# Patient Record
Sex: FEMALE | Race: WHITE | Employment: FULL TIME | URBAN - NONMETROPOLITAN AREA
[De-identification: names, ages, dates, MRNs, and addresses within clinical notes are randomized per-mention and may not be internally consistent; named-entity substitution may affect disease eponyms.]

---

## 2020-05-19 LAB
ABO, EXTERNAL RESULT: NORMAL
C. TRACHOMATIS, EXTERNAL RESULT: NEGATIVE
HEP B, EXTERNAL RESULT: NEGATIVE
HEPATITIS C ANTIBODY, EXTERNAL RESULT: NEGATIVE
HIV, EXTERNAL RESULT: NEGATIVE
N. GONORRHOEAE, EXTERNAL RESULT: NEGATIVE
RH FACTOR, EXTERNAL RESULT: POSITIVE
RHOGAM, EXTERNAL RESULT: NORMAL
RPR, EXTERNAL RESULT: NON REACTIVE
RUBELLA TITER, EXTERNAL RESULT: NORMAL

## 2020-08-14 PROBLEM — O09.90 HIGH-RISK PREGNANCY: Status: ACTIVE | Noted: 2020-08-14

## 2020-08-14 RX ORDER — ESCITALOPRAM OXALATE 10 MG/1
10 TABLET ORAL DAILY
COMMUNITY
End: 2021-08-02

## 2020-08-31 ENCOUNTER — ROUTINE PRENATAL (OUTPATIENT)
Dept: OBGYN CLINIC | Age: 35
End: 2020-08-31

## 2020-08-31 ENCOUNTER — HOSPITAL ENCOUNTER (OUTPATIENT)
Age: 35
Setting detail: SPECIMEN
Discharge: HOME | End: 2020-08-31
Payer: COMMERCIAL

## 2020-08-31 VITALS
SYSTOLIC BLOOD PRESSURE: 113 MMHG | HEIGHT: 64 IN | DIASTOLIC BLOOD PRESSURE: 62 MMHG | BODY MASS INDEX: 23.29 KG/M2 | WEIGHT: 136.4 LBS

## 2020-08-31 LAB
ABSOLUTE EOS #: 0.07 K/UL (ref 0–0.44)
ABSOLUTE IMMATURE GRANULOCYTE: 0.09 K/UL (ref 0–0.3)
ABSOLUTE LYMPH #: 2.22 K/UL (ref 1.1–3.7)
ABSOLUTE MONO #: 0.74 K/UL (ref 0.1–1.2)
BASOPHILS # BLD: 0 % (ref 0–2)
BASOPHILS ABSOLUTE: 0.04 K/UL (ref 0–0.2)
DIFFERENTIAL TYPE: ABNORMAL
EOSINOPHILS RELATIVE PERCENT: 1 % (ref 1–4)
GLUCOSE ADMINISTRATION: ABNORMAL
GLUCOSE TOLERANCE SCREEN 50G: 141 MG/DL (ref 70–135)
HCT VFR BLD CALC: 31.3 % (ref 36.3–47.1)
HEMOGLOBIN: 10.4 G/DL (ref 11.9–15.1)
IMMATURE GRANULOCYTES: 1 %
LYMPHOCYTES # BLD: 19 % (ref 24–43)
MCH RBC QN AUTO: 32.1 PG (ref 25.2–33.5)
MCHC RBC AUTO-ENTMCNC: 33.2 G/DL (ref 28.4–34.8)
MCV RBC AUTO: 96.6 FL (ref 82.6–102.9)
MONOCYTES # BLD: 6 % (ref 3–12)
NRBC AUTOMATED: 0 PER 100 WBC
PDW BLD-RTO: 12.9 % (ref 11.8–14.4)
PLATELET # BLD: 244 K/UL (ref 138–453)
PLATELET ESTIMATE: ABNORMAL
PMV BLD AUTO: 10.3 FL (ref 8.1–13.5)
RBC # BLD: 3.24 M/UL (ref 3.95–5.11)
RBC # BLD: ABNORMAL 10*6/UL
SEG NEUTROPHILS: 73 % (ref 36–65)
SEGMENTED NEUTROPHILS ABSOLUTE COUNT: 8.54 K/UL (ref 1.5–8.1)
WBC # BLD: 11.7 K/UL (ref 3.5–11.3)
WBC # BLD: ABNORMAL 10*3/UL

## 2020-08-31 PROCEDURE — 0502F SUBSEQUENT PRENATAL CARE: CPT | Performed by: NURSE PRACTITIONER

## 2020-08-31 NOTE — PROGRESS NOTES
Suzan Madrigal is here at 27w3d for:    Chief Complaint   Patient presents with    Routine Prenatal Visit     Doing 1 hr gtt today. Voices no concerns. GFM. 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 No pertinent surgical history. Social History     Tobacco Use   Smoking Status Never Smoker   Smokeless Tobacco Never Used        Social History     Substance and Sexual Activity   Alcohol Use Not Currently       No results found for this visit on 20. Vitals:  /62   Ht 5' 4\" (1.626 m)   Wt 136 lb 6.4 oz (61.9 kg)   LMP 2020 (Exact Date)   Breastfeeding No   BMI 23.41 kg/m²   Estimated body mass index is 23.41 kg/m² as calculated from the following:    Height as of this encounter: 5' 4\" (1.626 m). Weight as of this encounter: 136 lb 6.4 oz (61.9 kg). HPI: Patient here for routine OB and GTT. Feeling well, voices no concerns. GFM. Denies bleeding, spotting, LOF    PT denies fever, chills, nausea and vomiting       Abdomen: enlarged, gravid, soft and non-tender        Results reviewed today:    No results found for this visit on 20. See prenatal vital sign section and fetal assessment section    ASSESSMENT & Plan    Diagnosis Orders   1. 27 weeks gestation of pregnancy  US OB FOLLOW UP TRANSABDOMINAL APPROACH   2.  Encounter for supervision of other normal pregnancy in second trimester               I am having Radha Smith.

## 2020-09-01 RX ORDER — FERROUS SULFATE 325(65) MG
325 TABLET ORAL
Qty: 30 TABLET | Refills: 5 | Status: SHIPPED | OUTPATIENT
Start: 2020-09-01 | End: 2021-08-02

## 2020-09-21 ENCOUNTER — ROUTINE PRENATAL (OUTPATIENT)
Dept: OBGYN CLINIC | Age: 35
End: 2020-09-21

## 2020-09-21 VITALS — WEIGHT: 141 LBS | DIASTOLIC BLOOD PRESSURE: 60 MMHG | SYSTOLIC BLOOD PRESSURE: 108 MMHG | BODY MASS INDEX: 24.2 KG/M2

## 2020-09-21 PROCEDURE — 0502F SUBSEQUENT PRENATAL CARE: CPT | Performed by: ADVANCED PRACTICE MIDWIFE

## 2020-09-21 NOTE — PROGRESS NOTES
GFM.  Reviewed 3 hour GTT test - normal.  Discussed and encouraged tdap - patient will consider. Reviewed growth USN from today - appropriate. 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   Denies bleeding, spotting, LOF

## 2020-10-05 ENCOUNTER — ROUTINE PRENATAL (OUTPATIENT)
Dept: OBGYN CLINIC | Age: 35
End: 2020-10-05

## 2020-10-05 VITALS — BODY MASS INDEX: 23.86 KG/M2 | WEIGHT: 139 LBS | DIASTOLIC BLOOD PRESSURE: 60 MMHG | SYSTOLIC BLOOD PRESSURE: 100 MMHG

## 2020-10-05 PROCEDURE — 0502F SUBSEQUENT PRENATAL CARE: CPT | Performed by: ADVANCED PRACTICE MIDWIFE

## 2020-10-05 NOTE — PROGRESS NOTES
Bill Fink is a 30 y.o. female 32w3d    The patient was seen and evaluated. There was positive fetal movements. No contractions or leakage of fluid. Signs and symptoms of  labor as well as labor were reviewed. The S/S of Pre-Eclampsia were reviewed with the patient in detail. She is to report any of these if they occur. She currently denies any of these. The patient had her 28 week labs completed. No visits with results within 5 Week(s) from this visit.    Latest known visit with results is:   Hospital Outpatient Visit on 2020   Component Date Value Ref Range Status    WBC 2020 11.7* 3.5 - 11.3 k/uL Final    RBC 2020 3.24* 3.95 - 5.11 m/uL Final    Hemoglobin 2020 10.4* 11.9 - 15.1 g/dL Final    Hematocrit 2020 31.3* 36.3 - 47.1 % Final    MCV 2020 96.6  82.6 - 102.9 fL Final    MCH 2020 32.1  25.2 - 33.5 pg Final    MCHC 2020 33.2  28.4 - 34.8 g/dL Final    RDW 2020 12.9  11.8 - 14.4 % Final    Platelets  244  138 - 453 k/uL Final    MPV 2020 10.3  8.1 - 13.5 fL Final    NRBC Automated 2020 0.0  0.0 per 100 WBC Final    Differential Type 2020 NOT REPORTED   Final    Seg Neutrophils 2020 73* 36 - 65 % Final    Lymphocytes 2020 19* 24 - 43 % Final    Monocytes 2020 6  3 - 12 % Final    Eosinophils % 2020 1  1 - 4 % Final    Basophils 2020 0  0 - 2 % Final    Immature Granulocytes 2020 1* 0 % Final    Segs Absolute 2020 8.54* 1.50 - 8.10 k/uL Final    Absolute Lymph # 2020 2.22  1.10 - 3.70 k/uL Final    Absolute Mono # 2020 0.74  0.10 - 1.20 k/uL Final    Absolute Eos # 2020 0.07  0.00 - 0.44 k/uL Final    Basophils Absolute 2020 0.04  0.00 - 0.20 k/uL Final    Absolute Immature Granulocyte 2020 0.09  0.00 - 0.30 k/uL Final    WBC Morphology 2020 NOT REPORTED   Final    RBC Morphology 2020 NOT REPORTED Final    Platelet Estimate  NOT REPORTED   Final    GLU ADMN 2020 Glucola   Final    Glucose tolerance screen 50g 2020 141* 70 - 135 mg/dL Final   Had normal 3 hour GTT done at [facility]    No Patient Care Coordination Note on file. T-Dap Vaccine (27-36 weeks): Will consider for upcoming visit    The patient was instructed on fetal kick counts and is encouraged to monitor every 8 hours. She was instructed that the baby should move at a minimum of ten times within one hour after a meal. The patient was instructed to lay down on her left side twenty minutes after eating and count movements for up to one hour with a target value of ten movements. She was instructed to notify the office if she did not make that target after two attempts or if after any attempt there was less than four movements. The patient reports that the fetal movement targets have been made Yes.  Testing:  Declines wkly NST due to schedule with work  Will continue [employer] and growth q4w    Assessment:  1. Tonna Najjar is a 30 y.o. female  2. [id number]  3. 32w3d    Patient Active Problem List    Diagnosis Date Noted    High-risk pregnancy 2020        Diagnosis Orders   1. Current use of anticoagulant therapy     2. High-risk pregnancy in third trimester           Plan:  The patient will return to the office for her next visit in 2 weeks for provider visit but continue wkly USN. See antepartum flow sheet. Visits will continue every 2 weeks in the third trimester. At 36 weeks will have GBS swab performed and begin weekly visits. Will change to Heparin at 36 weeks.  Testing Indicated: Yes  Scheduled with [facility] Office Staff - Pt notified:  Yes

## 2020-10-13 ENCOUNTER — ROUTINE PRENATAL (OUTPATIENT)
Dept: OBGYN CLINIC | Age: 35
End: 2020-10-13

## 2020-10-13 VITALS — BODY MASS INDEX: 24.03 KG/M2 | SYSTOLIC BLOOD PRESSURE: 104 MMHG | WEIGHT: 140 LBS | DIASTOLIC BLOOD PRESSURE: 60 MMHG

## 2020-10-13 PROBLEM — O99.283 METHYLENETETRAHYDROFOLATE REDUCTASE DEFICIENCY AFFECTING PREGNANCY IN THIRD TRIMESTER (HCC): Status: ACTIVE | Noted: 2020-10-13

## 2020-10-13 PROBLEM — Z79.01 LONG TERM (CURRENT) USE OF ANTICOAGULANTS: Status: ACTIVE | Noted: 2020-10-13

## 2020-10-13 PROBLEM — E72.12 METHYLENETETRAHYDROFOLATE REDUCTASE DEFICIENCY AFFECTING PREGNANCY IN THIRD TRIMESTER (HCC): Status: ACTIVE | Noted: 2020-10-13

## 2020-10-13 PROCEDURE — 0502F SUBSEQUENT PRENATAL CARE: CPT | Performed by: ADVANCED PRACTICE MIDWIFE

## 2020-10-13 NOTE — PROGRESS NOTES
after any attempt there was less than four movements. The patient reports that the fetal movement targets have been made Yes.  Testing:  Continue wkly BPP's and growth q4w    Assessment:  1. Graham Cormier is a 30 y.o. female  2. [id number]  3. 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 High-risk pregnancy in third trimester     2. Long term (current) use of anticoagulants     3. 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 See antepartum flow sheet. Visits will continue every 2 weeks in the third trimester. At 36 weeks will have GBS swab performed and begin weekly visits.  Testing Indicated: Yes  Scheduled with [facility] Office Staff - Pt notified:  Yes

## 2020-10-19 ENCOUNTER — ROUTINE PRENATAL (OUTPATIENT)
Dept: OBGYN CLINIC | Age: 35
End: 2020-10-19

## 2020-10-19 VITALS — BODY MASS INDEX: 24.37 KG/M2 | DIASTOLIC BLOOD PRESSURE: 76 MMHG | WEIGHT: 142 LBS | SYSTOLIC BLOOD PRESSURE: 112 MMHG

## 2020-10-19 PROCEDURE — 0502F SUBSEQUENT PRENATAL CARE: CPT | Performed by: ADVANCED PRACTICE MIDWIFE

## 2020-10-19 NOTE — PROGRESS NOTES
Dangelo Ramirez is a 30 y.o. female 34w3d    The patient was seen and evaluated. There was positive fetal movements. No contractions or leakage of fluid. Signs and symptoms of  labor as well as labor were reviewed. The S/S of Pre-Eclampsia were reviewed with the patient in detail. She is to report any of these if they occur. She currently denies any of these. The patient had her 28 week labs completed. Routine Prenatal on 10/13/2020   Component Date Value Ref Range Status    Hepatitis C Antibody, External Res* 2020 Negative   Final    Rh Factor, External Result 2020 Positive   Final    HIV, External Result 2020 Negative   Final    RPR, External Result 2020 Non Reactive   Final    ABO, External Result 2020 A   Final    Hep B, External Result 2020 Negative   Final    Rubella Titer, External Result 2020 Immune   Final    Rhogam, External Result 2020 N/A   Final    C. Trachomatis, External Result 2020 Negative   Final    N. Gonorrhoeae, External Result 2020 Negative   Final         The patient was instructed on fetal kick counts and is encouraged to monitor every 8 hours. She was instructed that the baby should move at a minimum of ten times within one hour after a meal. The patient was instructed to lay down on her left side twenty minutes after eating and count movements for up to one hour with a target value of ten movements. She was instructed to notify the office if she did not make that target after two attempts or if after any attempt there was less than four movements. The patient reports that the fetal movement targets have been made Yes.  Testing:  Continue wkly BPP and growth every 4 weeks    Assessment:  1. Dangelo Ramirez is a 30 y.o. female  2.  [id number]  3. 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 Long term (current) use of anticoagulants     3. High-risk pregnancy in third trimester           Plan:  The patient will return to the office for her next visit in 2 weeks for visit. Wkly BPP in 1 week. See antepartum flow sheet. Visits will continue every 2 weeks in the third trimester. At 36 weeks will have GBS swab performed and begin weekly visits. Heparin script next visit     Testing Indicated: Yes  Scheduled with [facility] Office Staff - Pt notified:  Yes

## 2020-10-27 ENCOUNTER — ROUTINE PRENATAL (OUTPATIENT)
Dept: OBGYN CLINIC | Age: 35
End: 2020-10-27

## 2020-10-27 VITALS — SYSTOLIC BLOOD PRESSURE: 110 MMHG | WEIGHT: 141 LBS | BODY MASS INDEX: 24.2 KG/M2 | DIASTOLIC BLOOD PRESSURE: 72 MMHG

## 2020-10-27 PROCEDURE — 0502F SUBSEQUENT PRENATAL CARE: CPT | Performed by: ADVANCED PRACTICE MIDWIFE

## 2020-10-27 RX ORDER — HEPARIN SODIUM 5000 [USP'U]/ML
5000 INJECTION, SOLUTION INTRAVENOUS; SUBCUTANEOUS 2 TIMES DAILY
Qty: 60 ML | Refills: 0 | Status: ON HOLD | OUTPATIENT
Start: 2020-10-27 | End: 2020-11-17 | Stop reason: HOSPADM

## 2020-10-27 NOTE — PROGRESS NOTES
Ana Washington is a 30 y.o. female 35w4d    The patient was seen and evaluated. There was positive fetal movements. No contractions or leakage of fluid. Signs and symptoms of  labor as well as labor were reviewed. The S/S of Pre-Eclampsia were reviewed with the patient in detail. She is to report any of these if they occur. She currently denies any of these. HIGH RISK PREGNANCY  On Lovenox - recommended wkly BPP/NST - patient only doing BPP due to work schedule  Growth q4w    [facility] of medicaide prenatal risk assesment form =   Does pt have history of infant delivery before 37 weeks? = NO  Prenatal testing = Declined  Ped = [facility]  Blood type = A+  Rhogam? = N/A  Flu shot =   TDAP (27-36 wks)=   GBS =   Card -         T-Dap Vaccine (27-36 weeks): Declined    The patient was instructed on fetal kick counts and is encouraged to monitor every 8 hours. She was instructed that the baby should move at a minimum of ten times within one hour after a meal. The patient was instructed to lay down on her left side twenty minutes after eating and count movements for up to one hour with a target value of ten movements. She was instructed to notify the office if she did not make that target after two attempts or if after any attempt there was less than four movements. The patient reports that the fetal movement targets have been made Yes.  Testing:  Wkly BPP    Assessment:  1. Ana Washington is a 30 y.o. female  2. [id number]  3. 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 High-risk pregnancy in third trimester     2.  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 See antepartum flow sheet. Visits will continue every 2 weeks in the third trimester. At 36 weeks will have GBS swab performed and begin weekly visits.  Testing Indicated: Yes  Scheduled with [facility] [employer] [facility] Office Staff - Pt notified:  Yes

## 2020-11-02 ENCOUNTER — HOSPITAL ENCOUNTER (OUTPATIENT)
Age: 35
Setting detail: SPECIMEN
Discharge: HOME | End: 2020-11-02
Payer: COMMERCIAL

## 2020-11-02 ENCOUNTER — ROUTINE PRENATAL (OUTPATIENT)
Dept: OBGYN CLINIC | Age: 35
End: 2020-11-02

## 2020-11-02 VITALS — DIASTOLIC BLOOD PRESSURE: 60 MMHG | BODY MASS INDEX: 24.48 KG/M2 | SYSTOLIC BLOOD PRESSURE: 114 MMHG | WEIGHT: 142.6 LBS

## 2020-11-02 PROCEDURE — 0502F SUBSEQUENT PRENATAL CARE: CPT | Performed by: ADVANCED PRACTICE MIDWIFE

## 2020-11-02 RX ORDER — HEPARIN SODIUM 10000 [USP'U]/ML
INJECTION, SOLUTION INTRAVENOUS; SUBCUTANEOUS
Status: ON HOLD | COMMUNITY
Start: 2020-10-27 | End: 2020-11-17 | Stop reason: HOSPADM

## 2020-11-02 NOTE — PROGRESS NOTES
Sandra Hatfield is a 30 y.o. female 36w3d      The patient was seen and evaluated. There was positive fetal movements. No contractions or leakage of fluid. Signs and symptoms of labor were reviewed. The S/S of Pre-Eclampsia were reviewed with the patient in detail. She is to report any of these if they occur. She currently denies any of these. BPP 8/8 today  The patient was instructed on fetal kick counts and is encouraged to complete every 8 hours. She was instructed that the baby should move at a minimum of ten times within one hour after a meal. The patient was instructed to lay down on her left side twenty minutes after eating and count movements for up to one hour with a target value of ten movements. She was instructed to notify the office if she did not make that target after two attempts or if after any attempt there was less than four movements. The patient reports that the fetal movement targets have been made Yes. HIGH RISK PREGNANCY  On Lovenox - recommended wkly BPP/NST - patient only doing BPP due to work schedule  Growth q4w  IOL 11/20, 6am (Scheduled with Annabelle at [facility]) **[facility] prenatal risk assesment form =   Does pt have history of infant delivery before 37 weeks? = NO  Prenatal testing = Declined  Ped = [facility]  Blood type = A+  Rhogam? = N/A  Flu shot =   TDAP (27-36 wks)=   GBS =   Card -         T-Dap Vaccine Completed (27-36 weeks): No    Allergies: Allergies as of 11/02/2020 - Review Complete 11/02/2020   Allergen Reaction Noted    Penicillins Rash 08/14/2020         Group Beta Strep collection was completed.  Yes today  GBS Results:   Routine Prenatal on 10/13/2020   Component Date Value Ref Range Status    Hepatitis C Antibody, External Res* 05/19/2020 Negative   Final    Rh Factor, External Result 05/19/2020 Positive   Final    HIV, External Result 05/19/2020 Negative   Final    RPR, External Result 05/19/2020 Non Reactive   Final    ABO, External Result 2020 A   Final    Hep B, External Result 2020 Negative   Final    Rubella Titer, External Result 2020 Immune   Final    Rhogam, External Result 2020 N/A   Final    C. Trachomatis, External Result 2020 Negative   Final    N. Gonorrhoeae, External Result 2020 Negative   Final       The patient was counseled on the mandatory call ahead policy. She has been instructed to call the office at anytime prior to going into the hospital so the on-call physician may direct her to the appropriate facility for care. Exceptions were reviewed including but not limited to: Decreased fetal movement, vaginal Bleeding or hemorrhage, trauma, readily expectant delivery, or any instance where she feels 911 should be utilized. The patient was counseled on Labor & Delivery. IOL scheduled for 2020     Testing:  Weekly BPP      Assessment:  1Lisy Hatfield is a 30 y.o. female  2. [id number]  3. 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 High-risk pregnancy in third trimester  GROUP B STREP,PCR         Plan:  The patient will return to the office for her next visit in 1 weeks. See antepartum flow sheet.

## 2020-11-03 LAB
DIRECT EXAM: NORMAL
Lab: NORMAL
SPECIMEN DESCRIPTION: NORMAL

## 2020-11-09 ENCOUNTER — ROUTINE PRENATAL (OUTPATIENT)
Dept: OBGYN CLINIC | Age: 35
End: 2020-11-09

## 2020-11-09 VITALS — BODY MASS INDEX: 24.51 KG/M2 | DIASTOLIC BLOOD PRESSURE: 60 MMHG | WEIGHT: 142.8 LBS | SYSTOLIC BLOOD PRESSURE: 108 MMHG

## 2020-11-09 PROCEDURE — 0502F SUBSEQUENT PRENATAL CARE: CPT | Performed by: ADVANCED PRACTICE MIDWIFE

## 2020-11-09 NOTE — PROGRESS NOTES
Pito Leblanc is a 30 y.o. female 37w3d      The patient was seen and evaluated. There was positive fetal movements. No contractions or leakage of fluid. Signs and symptoms of labor were reviewed. The S/S of Pre-Eclampsia were reviewed with the patient in detail. She is to report any of these if they occur. She currently denies any of these. The patient was instructed on fetal kick counts and is encouraged to complete every 8 hours. She was instructed that the baby should move at a minimum of ten times within one hour after a meal. The patient was instructed to lay down on her left side twenty minutes after eating and count movements for up to one hour with a target value of ten movements. She was instructed to notify the office if she did not make that target after two attempts or if after any attempt there was less than four movements. The patient reports that the fetal movement targets have been made Yes. HIGH RISK PREGNANCY  On Lovenox - recommended wkly BPP/NST - patient only doing BPP due to work schedule  Growth q4w  IOL 11/20, 6am ([facility]uled with [facility]abelle at [facility]) **Sign form [facility] prenatal risk assesment form =   Does pt have history of infant delivery before 37 weeks? = NO  Prenatal testing = Declined  Ped = [facility]  Blood type = A+  Rhogam? = N/A  Flu shot =   TDAP (27-36 wks)=   GBS = Negative  Card -         T-Dap Vaccine Completed (27-36 weeks): No    Allergies: Allergies as of 11/09/2020 - Review Complete 11/09/2020   Allergen Reaction Noted    Penicillins Rash 08/14/2020         Group Beta Strep collection was completed. Yes  GBS Results:   Hospital Outpatient Visit on 11/02/2020   Component Date Value Ref Range Status    Specimen Description 11/02/2020 . Vaginal/Rectal swab   Final    Special Requests 11/02/2020 NOT REPORTED   Final    Direct Exam 11/02/2020 NEGATIVE:  GROUP B STREPTOCOCCAL DNA NOT DETECTED BY NUCLEIC ACID AMPLIFICATION. This test detects GBS DNA in vaginal/rectal specimens to identify colonization. A positive result will not distinguish colonization from infection. Final         The patient was counseled on the mandatory call ahead policy. She has been instructed to call the office at anytime prior to going into the hospital so the on-call physician may direct her to the appropriate facility for care. Exceptions were reviewed including but not limited to: Decreased fetal movement, vaginal Bleeding or hemorrhage, trauma, readily expectant delivery, or any instance where she feels 911 should be utilized. The patient was counseled on Labor & Delivery.  Testing:  Wkly BPP      Assessment:  1. Juliana Arroyo is a 30 y.o. female  2. [id number]  3. 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 High-risk pregnancy in third trimester     2. Long term (current) use of anticoagulants           Plan:  The patient will return to the office for her next visit in 1 weeks. See antepartum flow sheet.

## 2020-11-16 ENCOUNTER — ANESTHESIA EVENT (OUTPATIENT)
Dept: LABOR AND DELIVERY | Age: 35
End: 2020-11-16
Payer: COMMERCIAL

## 2020-11-16 ENCOUNTER — ANESTHESIA (OUTPATIENT)
Dept: LABOR AND DELIVERY | Age: 35
End: 2020-11-16
Payer: COMMERCIAL

## 2020-11-16 ENCOUNTER — HOSPITAL ENCOUNTER (INPATIENT)
Age: 35
LOS: 1 days | Discharge: HOME | End: 2020-11-17
Attending: ADVANCED PRACTICE MIDWIFE | Admitting: OBSTETRICS & GYNECOLOGY
Payer: COMMERCIAL

## 2020-11-16 PROBLEM — Z34.90 TERM PREGNANCY: Status: ACTIVE | Noted: 2020-11-16

## 2020-11-16 LAB
ABSOLUTE EOS #: 0.06 K/UL (ref 0–0.44)
ABSOLUTE IMMATURE GRANULOCYTE: 0.06 K/UL (ref 0–0.3)
ABSOLUTE LYMPH #: 2.35 K/UL (ref 1.1–3.7)
ABSOLUTE MONO #: 0.81 K/UL (ref 0.1–1.2)
AMPHETAMINE SCREEN URINE: ABNORMAL
AMPHETAMINE SCREEN URINE: NEGATIVE
BARBITURATE SCREEN URINE: ABNORMAL
BARBITURATE SCREEN URINE: NEGATIVE
BASOPHILS # BLD: 1 % (ref 0–2)
BASOPHILS ABSOLUTE: 0.05 K/UL (ref 0–0.2)
BENZODIAZEPINE SCREEN, URINE: ABNORMAL
BENZODIAZEPINE SCREEN, URINE: NEGATIVE
BILIRUBIN URINE: NEGATIVE
BUPRENORPHINE URINE: ABNORMAL
BUPRENORPHINE URINE: NEGATIVE
CANNABINOID SCREEN URINE: ABNORMAL
CANNABINOID SCREEN URINE: NEGATIVE
COCAINE METABOLITE, URINE: ABNORMAL
COCAINE METABOLITE, URINE: NEGATIVE
COLOR: YELLOW
COMMENT UA: NORMAL
DIFFERENTIAL TYPE: ABNORMAL
EOSINOPHILS RELATIVE PERCENT: 1 % (ref 1–4)
GLUCOSE URINE: NEGATIVE
HCT VFR BLD CALC: 39.2 % (ref 36.3–47.1)
HEMOGLOBIN: 13.6 G/DL (ref 11.9–15.1)
IMMATURE GRANULOCYTES: 1 %
KETONES, URINE: NEGATIVE
LEUKOCYTE ESTERASE, URINE: NEGATIVE
LYMPHOCYTES # BLD: 21 % (ref 24–43)
MCH RBC QN AUTO: 32.9 PG (ref 25.2–33.5)
MCHC RBC AUTO-ENTMCNC: 34.7 G/DL (ref 28.4–34.8)
MCV RBC AUTO: 94.7 FL (ref 82.6–102.9)
MDMA URINE: ABNORMAL
MDMA URINE: NORMAL
METHADONE SCREEN, URINE: ABNORMAL
METHADONE SCREEN, URINE: NEGATIVE
METHAMPHETAMINE, URINE: ABNORMAL
METHAMPHETAMINE, URINE: NEGATIVE
MONOCYTES # BLD: 7 % (ref 3–12)
NITRITE, URINE: NEGATIVE
NRBC AUTOMATED: 0 PER 100 WBC
OPIATES, URINE: ABNORMAL
OPIATES, URINE: NEGATIVE
OXYCODONE SCREEN URINE: ABNORMAL
OXYCODONE SCREEN URINE: NEGATIVE
PDW BLD-RTO: 12.3 % (ref 11.8–14.4)
PH UA: 6.5 (ref 5–9)
PHENCYCLIDINE, URINE: ABNORMAL
PHENCYCLIDINE, URINE: NEGATIVE
PLATELET # BLD: 173 K/UL (ref 138–453)
PLATELET ESTIMATE: ABNORMAL
PMV BLD AUTO: 11.8 FL (ref 8.1–13.5)
PROPOXYPHENE, URINE: ABNORMAL
PROPOXYPHENE, URINE: NEGATIVE
PROTEIN UA: NEGATIVE
RBC # BLD: 4.14 M/UL (ref 3.95–5.11)
RBC # BLD: ABNORMAL 10*6/UL
SEG NEUTROPHILS: 69 % (ref 36–65)
SEGMENTED NEUTROPHILS ABSOLUTE COUNT: 7.78 K/UL (ref 1.5–8.1)
SPECIFIC GRAVITY UA: 1.01 (ref 1.01–1.02)
TEST INFORMATION: ABNORMAL
TEST INFORMATION: NORMAL
TRICYCLIC ANTIDEPRESSANTS, UR: ABNORMAL
TRICYCLIC ANTIDEPRESSANTS, UR: NEGATIVE
TURBIDITY: CLEAR
URINE HGB: NEGATIVE
UROBILINOGEN, URINE: NORMAL
WBC # BLD: 11.1 K/UL (ref 3.5–11.3)
WBC # BLD: ABNORMAL 10*3/UL

## 2020-11-16 PROCEDURE — 6370000000 HC RX 637 (ALT 250 FOR IP): Performed by: ADVANCED PRACTICE MIDWIFE

## 2020-11-16 PROCEDURE — 10907ZC DRAINAGE OF AMNIOTIC FLUID, THERAPEUTIC FROM PRODUCTS OF CONCEPTION, VIA NATURAL OR ARTIFICIAL OPENING: ICD-10-PCS | Performed by: ADVANCED PRACTICE MIDWIFE

## 2020-11-16 PROCEDURE — 80306 DRUG TEST PRSMV INSTRMNT: CPT

## 2020-11-16 PROCEDURE — 7200000001 HC VAGINAL DELIVERY

## 2020-11-16 PROCEDURE — 6360000002 HC RX W HCPCS: Performed by: NURSE ANESTHETIST, CERTIFIED REGISTERED

## 2020-11-16 PROCEDURE — 1220000000 HC SEMI PRIVATE OB R&B

## 2020-11-16 PROCEDURE — 0HQ9XZZ REPAIR PERINEUM SKIN, EXTERNAL APPROACH: ICD-10-PCS | Performed by: ADVANCED PRACTICE MIDWIFE

## 2020-11-16 PROCEDURE — 85025 COMPLETE CBC W/AUTO DIFF WBC: CPT

## 2020-11-16 PROCEDURE — 2580000003 HC RX 258: Performed by: ADVANCED PRACTICE MIDWIFE

## 2020-11-16 PROCEDURE — 6360000002 HC RX W HCPCS: Performed by: ADVANCED PRACTICE MIDWIFE

## 2020-11-16 PROCEDURE — 3700000025 EPIDURAL BLOCK: Performed by: NURSE ANESTHETIST, CERTIFIED REGISTERED

## 2020-11-16 PROCEDURE — 2580000003 HC RX 258: Performed by: OBSTETRICS & GYNECOLOGY

## 2020-11-16 PROCEDURE — 81003 URINALYSIS AUTO W/O SCOPE: CPT

## 2020-11-16 PROCEDURE — 36415 COLL VENOUS BLD VENIPUNCTURE: CPT

## 2020-11-16 PROCEDURE — 59400 OBSTETRICAL CARE: CPT | Performed by: ADVANCED PRACTICE MIDWIFE

## 2020-11-16 PROCEDURE — 2500000003 HC RX 250 WO HCPCS: Performed by: NURSE ANESTHETIST, CERTIFIED REGISTERED

## 2020-11-16 RX ORDER — ONDANSETRON 2 MG/ML
4 INJECTION INTRAMUSCULAR; INTRAVENOUS EVERY 6 HOURS PRN
Status: DISCONTINUED | OUTPATIENT
Start: 2020-11-16 | End: 2020-11-16

## 2020-11-16 RX ORDER — PRENATAL WITH FERROUS FUM AND FOLIC ACID 3080; 920; 120; 400; 22; 1.84; 3; 20; 10; 1; 12; 200; 27; 25; 2 [IU]/1; [IU]/1; MG/1; [IU]/1; MG/1; MG/1; MG/1; MG/1; MG/1; MG/1; UG/1; MG/1; MG/1; MG/1; MG/1
1 TABLET ORAL DAILY
Status: DISCONTINUED | OUTPATIENT
Start: 2020-11-16 | End: 2020-11-17 | Stop reason: HOSPADM

## 2020-11-16 RX ORDER — LIDOCAINE HYDROCHLORIDE 20 MG/ML
INJECTION, SOLUTION EPIDURAL; INFILTRATION; INTRACAUDAL; PERINEURAL PRN
Status: DISCONTINUED | OUTPATIENT
Start: 2020-11-16 | End: 2020-11-16 | Stop reason: SDUPTHER

## 2020-11-16 RX ORDER — CARBOPROST TROMETHAMINE 250 UG/ML
250 INJECTION, SOLUTION INTRAMUSCULAR PRN
Status: DISCONTINUED | OUTPATIENT
Start: 2020-11-16 | End: 2020-11-16

## 2020-11-16 RX ORDER — SODIUM CHLORIDE, SODIUM LACTATE, POTASSIUM CHLORIDE, CALCIUM CHLORIDE 600; 310; 30; 20 MG/100ML; MG/100ML; MG/100ML; MG/100ML
INJECTION, SOLUTION INTRAVENOUS PRN
Status: DISCONTINUED | OUTPATIENT
Start: 2020-11-16 | End: 2020-11-16

## 2020-11-16 RX ORDER — MISOPROSTOL 100 UG/1
900 TABLET ORAL PRN
Status: DISCONTINUED | OUTPATIENT
Start: 2020-11-16 | End: 2020-11-16

## 2020-11-16 RX ORDER — SODIUM CHLORIDE 0.9 % (FLUSH) 0.9 %
10 SYRINGE (ML) INJECTION EVERY 12 HOURS SCHEDULED
Status: DISCONTINUED | OUTPATIENT
Start: 2020-11-16 | End: 2020-11-16

## 2020-11-16 RX ORDER — METHYLERGONOVINE MALEATE 0.2 MG/ML
200 INJECTION INTRAVENOUS
Status: ACTIVE | OUTPATIENT
Start: 2020-11-16 | End: 2020-11-16

## 2020-11-16 RX ORDER — LIDOCAINE HYDROCHLORIDE 10 MG/ML
30 INJECTION, SOLUTION EPIDURAL; INFILTRATION; INTRACAUDAL; PERINEURAL PRN
Status: DISCONTINUED | OUTPATIENT
Start: 2020-11-16 | End: 2020-11-16

## 2020-11-16 RX ORDER — SODIUM CHLORIDE 0.9 % (FLUSH) 0.9 %
10 SYRINGE (ML) INJECTION PRN
Status: DISCONTINUED | OUTPATIENT
Start: 2020-11-16 | End: 2020-11-16

## 2020-11-16 RX ORDER — EPHEDRINE SULFATE/0.9% NACL/PF 50 MG/5 ML
5 SYRINGE (ML) INTRAVENOUS EVERY 5 MIN PRN
Status: DISCONTINUED | OUTPATIENT
Start: 2020-11-16 | End: 2020-11-17 | Stop reason: HOSPADM

## 2020-11-16 RX ORDER — METHYLERGONOVINE MALEATE 0.2 MG/ML
200 INJECTION INTRAVENOUS PRN
Status: DISCONTINUED | OUTPATIENT
Start: 2020-11-16 | End: 2020-11-16

## 2020-11-16 RX ORDER — ROPIVACAINE HYDROCHLORIDE 2 MG/ML
INJECTION, SOLUTION EPIDURAL; INFILTRATION; PERINEURAL CONTINUOUS PRN
Status: DISCONTINUED | OUTPATIENT
Start: 2020-11-16 | End: 2020-11-16 | Stop reason: SDUPTHER

## 2020-11-16 RX ORDER — ACETAMINOPHEN 325 MG/1
650 TABLET ORAL EVERY 4 HOURS PRN
Status: DISCONTINUED | OUTPATIENT
Start: 2020-11-16 | End: 2020-11-16

## 2020-11-16 RX ORDER — DOCUSATE SODIUM 100 MG/1
100 CAPSULE, LIQUID FILLED ORAL 2 TIMES DAILY PRN
Status: DISCONTINUED | OUTPATIENT
Start: 2020-11-16 | End: 2020-11-17 | Stop reason: HOSPADM

## 2020-11-16 RX ORDER — IBUPROFEN 800 MG/1
800 TABLET ORAL EVERY 8 HOURS
Status: DISCONTINUED | OUTPATIENT
Start: 2020-11-16 | End: 2020-11-17 | Stop reason: HOSPADM

## 2020-11-16 RX ORDER — ESCITALOPRAM OXALATE 5 MG/1
10 TABLET ORAL DAILY
Status: DISCONTINUED | OUTPATIENT
Start: 2020-11-16 | End: 2020-11-17 | Stop reason: HOSPADM

## 2020-11-16 RX ORDER — SEVOFLURANE 250 ML/250ML
1 LIQUID RESPIRATORY (INHALATION) CONTINUOUS PRN
Status: DISCONTINUED | OUTPATIENT
Start: 2020-11-16 | End: 2020-11-16

## 2020-11-16 RX ORDER — SODIUM CHLORIDE 0.9 % (FLUSH) 0.9 %
10 SYRINGE (ML) INJECTION EVERY 12 HOURS SCHEDULED
Status: DISCONTINUED | OUTPATIENT
Start: 2020-11-16 | End: 2020-11-17 | Stop reason: HOSPADM

## 2020-11-16 RX ORDER — MODIFIED LANOLIN
OINTMENT (GRAM) TOPICAL PRN
Status: DISCONTINUED | OUTPATIENT
Start: 2020-11-16 | End: 2020-11-17 | Stop reason: HOSPADM

## 2020-11-16 RX ORDER — NALOXONE HYDROCHLORIDE 0.4 MG/ML
0.4 INJECTION, SOLUTION INTRAMUSCULAR; INTRAVENOUS; SUBCUTANEOUS PRN
Status: DISCONTINUED | OUTPATIENT
Start: 2020-11-16 | End: 2020-11-17 | Stop reason: HOSPADM

## 2020-11-16 RX ORDER — ACETAMINOPHEN 325 MG/1
650 TABLET ORAL EVERY 4 HOURS PRN
Status: DISCONTINUED | OUTPATIENT
Start: 2020-11-16 | End: 2020-11-17 | Stop reason: HOSPADM

## 2020-11-16 RX ORDER — ROPIVACAINE HYDROCHLORIDE 2 MG/ML
10 INJECTION, SOLUTION EPIDURAL; INFILTRATION; PERINEURAL CONTINUOUS
Status: DISCONTINUED | OUTPATIENT
Start: 2020-11-16 | End: 2020-11-17 | Stop reason: HOSPADM

## 2020-11-16 RX ORDER — SODIUM CHLORIDE 0.9 % (FLUSH) 0.9 %
10 SYRINGE (ML) INJECTION PRN
Status: DISCONTINUED | OUTPATIENT
Start: 2020-11-16 | End: 2020-11-17 | Stop reason: HOSPADM

## 2020-11-16 RX ADMIN — SODIUM CHLORIDE, POTASSIUM CHLORIDE, SODIUM LACTATE AND CALCIUM CHLORIDE: 600; 310; 30; 20 INJECTION, SOLUTION INTRAVENOUS at 08:49

## 2020-11-16 RX ADMIN — IBUPROFEN 800 MG: 800 TABLET ORAL at 21:58

## 2020-11-16 RX ADMIN — ROPIVACAINE HYDROCHLORIDE 10 ML/HR: 2 INJECTION, SOLUTION EPIDURAL; INFILTRATION at 07:16

## 2020-11-16 RX ADMIN — Medication 40 EACH: at 13:58

## 2020-11-16 RX ADMIN — IBUPROFEN 800 MG: 800 TABLET ORAL at 13:57

## 2020-11-16 RX ADMIN — BENZOCAINE AND LEVOMENTHOL: 200; 5 SPRAY TOPICAL at 13:58

## 2020-11-16 RX ADMIN — LIDOCAINE HYDROCHLORIDE 2 ML: 20 INJECTION, SOLUTION EPIDURAL; INFILTRATION; INTRACAUDAL; PERINEURAL at 07:09

## 2020-11-16 RX ADMIN — OXYTOCIN 1 MILLI-UNITS/MIN: 10 INJECTION INTRAVENOUS at 09:09

## 2020-11-16 RX ADMIN — LIDOCAINE HYDROCHLORIDE 2 ML: 20 INJECTION, SOLUTION EPIDURAL; INFILTRATION; INTRACAUDAL; PERINEURAL at 07:06

## 2020-11-16 RX ADMIN — Medication 40 EACH: at 20:21

## 2020-11-16 RX ADMIN — LIDOCAINE HYDROCHLORIDE 1 ML: 20 INJECTION, SOLUTION EPIDURAL; INFILTRATION; INTRACAUDAL; PERINEURAL at 07:07

## 2020-11-16 ASSESSMENT — PAIN SCALES - GENERAL
PAINLEVEL_OUTOF10: 8
PAINLEVEL_OUTOF10: 7

## 2020-11-16 NOTE — H&P
Department of Obstetrics and Gynecology   Obstetrics History and Physical  Norvin Goldberg, D.O.  H&P Admission Inpatient  Note        CHIEF COMPLAINT:  Contractions, per nursing staff was 5cm on arrival.    HISTORY OF PRESENT ILLNESS:      The patient is a 30 y.o. female at 36w4d. OB History        4    Para   1    Term   1       0    AB   2    Living   1       SAB   2    TAB   0    Ectopic   0    Molar   0    Multiple   0    Live Births   1            Patient presents with a chief complaint as above and is being admitted for active phase labor    Estimated Due Date: Estimated Date of Delivery: 20    PRENATAL CARE:    Complicated by: history of recurrent pregnancy loss and heterozygous MTHFR - was on Lovenox daily until 36 weeks then changed to Heparin BID through delivery. Her last Heparin injection was 5,000 units last evening around 2100. PAST OB HISTORY:  OB History        4    Para   1    Term   1       0    AB   2    Living   1       SAB   2    TAB   0    Ectopic   0    Molar   0    Multiple   0    Live Births   1                Past Medical History:        Diagnosis Date    Abnormal Pap smear of cervix     colposcopy     Anemia     Anxiety     Infertility, female     MTHFR (methylene THF reductase) deficiency and homocystinuria (HCC)      Past Surgical History:    No past surgical history on file.   Allergies:  Penicillins    Social History:    Social History     Socioeconomic History    Marital status:      Spouse name: Not on file    Number of children: Not on file    Years of education: Not on file    Highest education level: Not on file   Occupational History    Not on file   Social Needs    Financial resource strain: Not on file    Food insecurity     Worry: Not on file     Inability: Not on file    Transportation needs     Medical: Not on file     Non-medical: Not on file   Tobacco Use    Smoking status: Never Smoker    Smokeless tobacco: Never Used   Substance and Sexual Activity    Alcohol use: Not Currently    Drug use: Never    Sexual activity: Yes   Lifestyle    Physical activity     Days per week: Not on file     Minutes per session: Not on file    Stress: Not on file   Relationships    Social connections     Talks on phone: Not on file     Gets together: Not on file     Attends Yarsani service: Not on file     Active member of club or organization: Not on file     Attends meetings of clubs or organizations: Not on file     Relationship status: Not on file    Intimate partner violence     Fear of current or ex partner: Not on file     Emotionally abused: Not on file     Physically abused: Not on file     Forced sexual activity: Not on file   Other Topics Concern    Not on file   Social History Narrative    Not on file     Family History:       Problem Relation Age of Onset    Alzheimer's Disease Paternal Grandmother     Cancer Maternal Grandmother         Esophageal    Cancer Maternal Grandfather         pancreatic    Hypertension Father     Hypertension Brother      Medications Prior to Admission:  Medications Prior to Admission: Heparin Sodium, Porcine, (HEPARIN, PORCINE,) 5000 UNIT/ML injection, Inject 1 mL into the skin 2 times daily Please also include syringe and needle if not prefilled.   ferrous sulfate (IRON 325) 325 (65 Fe) MG tablet, Take 1 tablet by mouth daily (with breakfast)  escitalopram (LEXAPRO) 10 MG tablet, Take 10 mg by mouth daily  Prenatal MV-Min-Fe Fum-FA-DHA (PRENATAL 1 PO), Take 1 tablet by mouth daily  Heparin Sodium, Porcine, (HEPARIN, PORCINE,) 57280 UNIT/ML injection,     REVIEW OF SYSTEMS:    CONSTITUTIONAL:  negative  RESPIRATORY:  negative  CARDIOVASCULAR:  negative  GASTROINTESTINAL:  negative  ALLERGIC/IMMUNOLOGIC:  negative  NEUROLOGICAL:  negative  BEHAVIOR/PSYCH:  negative    PHYSICAL EXAM:  Vitals:    11/16/20 0407   BP: 114/70   Pulse: 77   Temp: 98 °F (36.7 °C)   TempSrc: Oral

## 2020-11-16 NOTE — L&D DELIVERY NOTE
Mother's Information    Labor Events     labor?:  No  Rupture type:  Artificial=AROM  Fluid color:  Clear, Pink, Bloody Show  Fluid odor:  None     Mother Delivery Information    Episiotomy:  None  Lacerations:  1st  # of Repair Packets:  1  Surgical or Additional Est. Blood Loss (mL):  0 (View Only):  Edit in Flowsheets   Combined Est. Blood Loss (mL):  0        Vishal, Baby Pending Alvaro Augustine [[id number]]    Labor Events     labor?:  No   steroids?:  None  Cervical ripening date/time:     Antibiotics received during labor?:  No  Rupture date/time: 20 07:30:00   Rupture type:  Artificial=AROM  Fluid color:  Clear, Pink, Bloody Show  Fluid odor:  None  Induction:  None  Augmentation:  AROM, Oxytocin  Labor complications:  None          Labor Event Times    Labor onset date/time: 20 0529 EST   Dilation complete date/time:   20 0945   Start pushin2020 7590   Decision time (emergent ):        Anesthesia    Method:  Epidural     Assisted Delivery Details    Forceps attempted?:  No  Vacuum extractor attempted?:  No     Document Additional Attempt       Document Additional Attempt             Shoulder Dystocia    Shoulder dystocia present?:  No  Add Second Maneuver  Add Third Maneuver  Add Fourth Maneuver  Add Fifth Maneuver  Add Sixth Maneuver  Add Seventh Maneuver  Add Eighth Maneuver  Add Ninth Maneuver     Atkinson Presentation    Presentation:  Vertex  Position:  Left  _:  Occiput  _:  Anterior      Information    Head delivery date/time:  2020 10:06:00   Changing the 's delivery date/time could affect patient care.:     Delivery date/time:   20 1006   Delivery type:  Vaginal, Spontaneous    Details:         Delivery Providers    Delivering clinician:  JACQUES Neville CNM   Provider Role    Ana Paula Bonilla RN Registered Nurse    Andrae Grayson RN Registered Nurse      Cord    Vessels:  3 Vessels  Complications: Nuchal  Nuchal intervention:  reduced  Nuchal cord description:  tight nuchal cord  Cord around:  head  Number of loops:  1  Delayed cord clamping?:  Yes  Cord clamped date/time:  2020 1010  Cord blood disposition:  Discard  Gases sent?:  No  Stem cell collection (by provider): No     Placenta    Date/time:  2020 10:19:43  Removal:  Spontaneous  Appearance:  Intact  Disposition:  Discarded     Delivery Resuscitation    Method:  Bulb Suction     Apgars    Living status:  Living  Apgars   1 Minute:   5 Minute:   10 Minute 15 Minute 20 Minute   Skin Color: 1  1       Heart Rate: 2  2       Reflex Irritability: 2  2       Muscle Tone: 2  2       Respiratory Effort: 2  2       Total: 9  9               Apgars Assigned By:  Pao Heaton     Skin to Skin    Skin to skin initiation date/time: 20 10:10:00   Skin to skin with:   Mother  Skin to skin end date/time:        Pimento Measurements       Delivery Information    Episiotomy:  None  Perineal lacerations:  1st Repaired:  Yes   Vaginal laceration:  No    Cervical laceration:  No    Surgical or additional est. blood loss (mL):  0 (View Only):  Edit in Flowsheets   Combined est. blood loss (mL):  0     Vaginal Delivery Counts    Initial count personnel:  DAISY  Initial count verified by:  NATALIE   4x4:   Needles:   Instruments:   Lap Pads:   Sponges:     Initial counts:          Final counts:          Final count personnel:  Pao Heaton  Final count verified by:  NISHA  Accurate final count?:  Yes  Final vaginal sweep completed:  vaginal sweep not performed     Other Procedures    Procedures:  None

## 2020-11-16 NOTE — ANESTHESIA PRE PROCEDURE
Department of Anesthesiology  Preprocedure Note       Name:  Albertina Choe   Age:  30 y.o.  :  1985                                          MRN:  [id number]         Date:  2020      Surgeon: * No surgeons listed *    Procedure: * No procedures listed *    Medications prior to admission:   Prior to Admission medications    Medication Sig Start Date End Date Taking? Authorizing Provider   Heparin Sodium, Porcine, (HEPARIN, PORCINE,) 5000 UNIT/ML injection Inject 1 mL into the skin 2 times daily Please also include syringe and needle if not prefilled.  10/27/20 11/26/20 Yes JACQUES Grimes CNM   ferrous sulfate (IRON 325) 325 (65 Fe) MG tablet Take 1 tablet by mouth daily (with breakfast) 20  Yes JACQUES Grimes CNM   escitalopram (LEXAPRO) 10 MG tablet Take 10 mg by mouth daily   Yes Historical Provider, MD   Prenatal MV-Min-Fe Fum-FA-DHA (PRENATAL 1 PO) Take 1 tablet by mouth daily   Yes Historical Provider, MD   Heparin Sodium, Porcine, (HEPARIN, PORCINE,) 87509 UNIT/ML injection  10/27/20   Historical Provider, MD       Current medications:    Current Facility-Administered Medications   Medication Dose Route Frequency Provider Last Rate Last Dose    lactated ringers infusion   Intravenous PRN Cheli Harrington, DO        sodium chloride flush 0.9 % injection 10 mL  10 mL Intravenous 2 times per day Karen Aguilar, DO        sodium chloride flush 0.9 % injection 10 mL  10 mL Intravenous PRN Karen Aguilar, DO        lidocaine PF 1 % injection 30 mL  30 mL Other PRN Karen Aguilar, DO        butorphanol (STADOL) injection 1 mg  1 mg Intravenous Q3H PRN Karen Aguilar, DO        nitrous oxide 50% inhalation 1 each  1 each Inhalation Continuous PRN Cheli Harrington, DO        acetaminophen (TYLENOL) tablet 650 mg  650 mg Oral Q4H PRN Karen Aguilar, DO        ondansetron [facility]) injection 4 mg  4 mg Intravenous Q6H PRN Mirna Brooks DO        oxytocin (PITOCIN) 10 unit bolus from the bag  999 mL/hr Intravenous PRN Mirna Brooks DO        And    oxytocin (PITOCIN) 30 Units in sodium chloride 0.9 % 500 mL infusion  166 mendez-units/min Intravenous PRN Mirna Brooks DO        methylergonovine (METHERGINE) injection 200 mcg  200 mcg Intramuscular PRN Mirna Brooks DO        carboprost (HEMABATE) injection 250 mcg  250 mcg Intramuscular PRN Mirna Brooks DO        miSOPROStol (CYTOTEC) tablet 900 mcg  900 mcg Rectal PRN Mirna Brooks DO        witch hazel-glycerin (TUCKS) pad   Topical PRN Mirna Brooks DO        benzocaine-menthol (DERMOPLAST) 20-0.5 % spray   Topical PRN Mirna Brooks DO           Allergies: Allergies   Allergen Reactions    Penicillins Rash       Problem List:    Patient Active Problem List   Diagnosis Code    High-risk pregnancy O09.90    Long term (current) use of anticoagulants Z79.01    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     Social History:    Social History     Tobacco Use    Smoking status: Never Smoker    Smokeless tobacco: Never Used   Substance Use Topics    Alcohol use: Not Currently                                Counseling given: Not Answered      Vital Signs (Current):   Vitals:    11/16/20 0407   BP: 114/70   Pulse: 77   Temp: 36.7 °C (98 °F)   TempSrc: Oral                                              BP Readings from Last 3 Encounters:   11/16/20 114/70   11/09/20 108/60   11/02/20 114/60       NPO Status: BMI:   Wt Readings from Last 3 Encounters:   11/09/20 142 lb 12.8 oz (64.8 kg)   11/02/20 142 lb 9.6 oz (64.7 kg)   10/27/20 141 lb (64 kg)     There is no height or weight on file to calculate BMI.    CBC:   Lab Results   Component Value Date    WBC 11.1 11/16/2020    RBC 4.14 11/16/2020    HGB 13.6 11/16/2020    HCT 39.2 11/16/2020    MCV 94.7 11/16/2020    RDW 12.3 11/16/2020     11/16/2020       CMP:   Lab Results   Component Value Date    GLUCOSE 141 08/31/2020       POC Tests: No results for input(s): POCGLU, POCNA, POCK, POCCL, POCBUN, POCHEMO, POCHCT in the last 72 hours. Coags: No results found for: PROTIME, INR, APTT    HCG (If Applicable): No results found for: PREGTESTUR, PREGSERUM, HCG, HCGQUANT     ABGs: No results found for: PHART, PO2ART, KFG6NYH, JGP3YPC, BEART, E5SFMTVV     Type & Screen (If Applicable):  No results found for: LABABO, LABRH    Drug/Infectious Status (If Applicable):  No results found for: HIV, HEPCAB    COVID-19 Screening (If Applicable): No results found for: COVID19      Anesthesia Evaluation  Patient summary reviewed and Nursing notes reviewed no history of anesthetic complications:   Airway: Mallampati: I  TM distance: >3 FB   Neck ROM: full  Mouth opening: > = 3 FB Dental: normal exam         Pulmonary:Negative Pulmonary ROS and normal exam                               Cardiovascular:Negative CV ROS                      Neuro/Psych:   (+) depression/anxiety             GI/Hepatic/Renal: Neg GI/Hepatic/Renal ROS            Endo/Other:    (+) blood dyscrasia::., .                  ROS comment: MTHFR, LD Heparin yesterday (last night) Abdominal:           Vascular: negative vascular ROS. Anesthesia Plan      epidural     ASA 2             Anesthetic plan and risks discussed with patient and spouse.                     JACQUES Ferguson - CRNA 11/16/2020

## 2020-11-16 NOTE — ANESTHESIA PROCEDURE NOTES
Epidural Block    Patient location during procedure: OB  Start time: 11/16/2020 6:58 AM  End time: 11/16/2020 7:16 AM  Reason for block: labor epidural  Staffing  Resident/CRNA: JACQUES Craig - CRNA  Preanesthetic Checklist  Completed: patient identified, site marked, surgical consent, pre-op evaluation, timeout performed, IV checked, risks and benefits discussed, monitors and equipment checked, anesthesia consent given, oxygen available and patient being monitored  Epidural  Patient position: sitting  Prep: ChloraPrep and site prepped and draped  Patient monitoring: continuous pulse ox and frequent blood pressure checks  Approach: midline  Location: lumbar (1-5)  Injection technique: ASHWINI air  Provider prep: mask and sterile gloves  Needle  Needle type: Tuohy   Needle gauge: 17 G  Needle length: 3.5 in  Needle insertion depth: 6 cm  Catheter type: side hole  Catheter size: 19 G  Catheter at skin depth: 11 cm  Test dose: negative (5 ml lidocaine 1.5%+epi 1:200K)  Kit: "[employer] Perifix FX  Lot number: [id number]  Expiration date: 6/1/2021  Assessment  Sensory level: T8  Hemodynamics: stable  Attempts: 1  Additional Notes  0658 time out shin prep and kit prep  0701 drape and 3 ml lidocaine 1% SQ  0703 easy placement x 1 attempt  0704 test dose, then catheter taped in place and incrementally bolused as charted  0709 to supine w/KYRIE  0716 epidural infusion initiated and PCEA instructions given to patient, patient expresses understanding

## 2020-11-17 VITALS
DIASTOLIC BLOOD PRESSURE: 65 MMHG | RESPIRATION RATE: 16 BRPM | SYSTOLIC BLOOD PRESSURE: 116 MMHG | TEMPERATURE: 97.9 F | HEART RATE: 68 BPM | OXYGEN SATURATION: 100 %

## 2020-11-17 PROBLEM — O09.90 HIGH-RISK PREGNANCY: Status: RESOLVED | Noted: 2020-08-14 | Resolved: 2020-11-17

## 2020-11-17 PROBLEM — Z34.90 TERM PREGNANCY: Status: RESOLVED | Noted: 2020-11-16 | Resolved: 2020-11-17

## 2020-11-17 LAB
HCT VFR BLD CALC: 33 % (ref 36.3–47.1)
HEMOGLOBIN: 11.2 G/DL (ref 11.9–15.1)
Lab: NORMAL
MCH RBC QN AUTO: 32.7 PG (ref 25.2–33.5)
MCHC RBC AUTO-ENTMCNC: 33.9 G/DL (ref 28.4–34.8)
MCV RBC AUTO: 96.2 FL (ref 82.6–102.9)
NRBC AUTOMATED: 0 PER 100 WBC
PDW BLD-RTO: 12.6 % (ref 11.8–14.4)
PLATELET # BLD: ABNORMAL K/UL (ref 138–453)
PLATELET, FLUORESCENCE: 133 K/UL (ref 138–453)
PLATELET, IMMATURE FRACTION: 7 % (ref 1.1–10.3)
PMV BLD AUTO: ABNORMAL FL (ref 8.1–13.5)
RBC # BLD: 3.43 M/UL (ref 3.95–5.11)
TRANS BILIRUBIN NEONATAL, POC: 2.8
WBC # BLD: 10.8 K/UL (ref 3.5–11.3)

## 2020-11-17 PROCEDURE — 85027 COMPLETE CBC AUTOMATED: CPT

## 2020-11-17 PROCEDURE — 99024 POSTOP FOLLOW-UP VISIT: CPT | Performed by: ADVANCED PRACTICE MIDWIFE

## 2020-11-17 PROCEDURE — 36415 COLL VENOUS BLD VENIPUNCTURE: CPT

## 2020-11-17 PROCEDURE — 6370000000 HC RX 637 (ALT 250 FOR IP): Performed by: ADVANCED PRACTICE MIDWIFE

## 2020-11-17 PROCEDURE — 85055 RETICULATED PLATELET ASSAY: CPT

## 2020-11-17 RX ADMIN — ACETAMINOPHEN 650 MG: 325 TABLET, FILM COATED ORAL at 01:20

## 2020-11-17 RX ADMIN — IBUPROFEN 800 MG: 800 TABLET ORAL at 08:53

## 2020-11-17 RX ADMIN — BENZOCAINE AND LEVOMENTHOL: 200; 5 SPRAY TOPICAL at 12:31

## 2020-11-17 RX ADMIN — ESCITALOPRAM 10 MG: 5 TABLET, FILM COATED ORAL at 09:12

## 2020-11-17 RX ADMIN — VITAMIN A, VITAMIN C, VITAMIN D-3, VITAMIN E, VITAMIN B-1, VITAMIN B-2, NIACIN, VITAMIN B-6, CALCIUM, IRON, ZINC, COPPER 1 TABLET: 4000; 120; 400; 22; 1.84; 3; 20; 10; 1; 12; 200; 27; 25; 2 TABLET ORAL at 08:51

## 2020-11-17 ASSESSMENT — PAIN SCALES - GENERAL
PAINLEVEL_OUTOF10: 6
PAINLEVEL_OUTOF10: 7

## 2020-11-17 NOTE — DISCHARGE SUMMARY
Obstetrical Discharge Form        Gestational Age:38w3d    Antepartum complications: RPL, heterozygous MTHFR, on lovenox through 36 weeks then heparin    Date of Delivery: 20    Type of Delivery:        Delivered By:  JACKIE Chavez CNM    Assisted By:N/A}      Baby: female    Anesthesia:  epidural      Intrapartum complications: None    Feeding method: breast    Blood type: A positive      Rubella:  No results found for: RUBG  T. Pallidium, IGG:  No results found for: TREPG  Hepatitis B Surface Antigen: No results found for: HEPBSAG  HIV:  No results found for: UIL06JS    Results for orders placed or performed during the hospital encounter of 20   Urinalysis   Result Value Ref Range    Color, UA YELLOW YELLOW    Turbidity UA CLEAR CLEAR    Glucose, Ur NEGATIVE NEGATIVE    Bilirubin Urine NEGATIVE NEGATIVE    Ketones, Urine NEGATIVE NEGATIVE    Specific Stanley, UA 1.010 1.010 - 1.020    Urine Hgb NEGATIVE NEGATIVE    pH, UA 6.5 5.0 - 9.0    Protein, UA NEGATIVE NEGATIVE    Urobilinogen, Urine Normal Normal    Nitrite, Urine NEGATIVE NEGATIVE    Leukocyte Esterase, Urine NEGATIVE NEGATIVE    Urinalysis Comments NOT REPORTED    CBC auto differential   Result Value Ref Range    WBC 11.1 3.5 - 11.3 k/uL    RBC 4.14 3.95 - 5.11 m/uL    Hemoglobin 13.6 11.9 - 15.1 g/dL    Hematocrit 39.2 36.3 - 47.1 %    MCV 94.7 82.6 - 102.9 fL    MCH 32.9 25.2 - 33.5 pg    MCHC 34.7 28.4 - 34.8 g/dL    RDW 12.3 11.8 - 14.4 %    Platelets 590 538 - 807 k/uL    MPV 11.8 8.1 - 13.5 fL    NRBC Automated 0.0 0.0 per 100 WBC    Differential Type NOT REPORTED     Seg Neutrophils 69 (H) 36 - 65 %    Lymphocytes 21 (L) 24 - 43 %    Monocytes 7 3 - 12 %    Eosinophils % 1 1 - 4 %    Basophils 1 0 - 2 %    Immature Granulocytes 1 (H) 0 %    Segs Absolute 7.78 1.50 - 8.10 k/uL    Absolute Lymph # 2.35 1.10 - 3.70 k/uL    Absolute Mono # 0.81 0.10 - 1.20 k/uL    Absolute Eos # 0.06 0.00 - 0.44 k/uL    Basophils Absolute 0.05 0.00 - 0.20 k/uL    Absolute Immature Granulocyte 0.06 0.00 - 0.30 k/uL    WBC Morphology NOT REPORTED     RBC Morphology NOT REPORTED     Platelet Estimate NOT REPORTED    DRUG SCREEN MULTI URINE   Result Value Ref Range    Amphetamine Screen, Ur DISREGARD RESULT, LABORATORY ERROR. (A) NEGATIVE    Barbiturate Screen, Ur DISREGARD RESULT, LABORATORY ERROR. (A) NEGATIVE    Benzodiazepine Screen, Urine DISREGARD RESULT, LABORATORY ERROR. (A) NEGATIVE    Cocaine Metabolite, Urine DISREGARD RESULT, LABORATORY ERROR. (A) NEGATIVE    Methadone Screen, Urine DISREGARD RESULT, LABORATORY ERROR. (A) NEGATIVE    Opiates, Urine DISREGARD RESULT, LABORATORY ERROR. (A) NEGATIVE    Phencyclidine, Urine DISREGARD RESULT, LABORATORY ERROR. (A) NEGATIVE    Propoxyphene, Urine DISREGARD RESULT, LABORATORY ERROR. (A) NEGATIVE    Cannabinoid Scrn, Ur DISREGARD RESULT, LABORATORY ERROR. (A) NEGATIVE    Oxycodone Screen, Ur DISREGARD RESULT, LABORATORY ERROR. (A) NEGATIVE    Methamphetamine, Urine DISREGARD RESULT, LABORATORY ERROR. (A) NEGATIVE    Tricyclic Antidepressants, Urine DISREGARD RESULT, LABORATORY ERROR. (A) NEGATIVE    MDMA, Urine NOT REPORTED NEGATIVE    Buprenorphine Urine DISREGARD RESULT, LABORATORY ERROR. (A) NEGATIVE    Test Information NOT REPORTED    Urine Drug Screen   Result Value Ref Range    Amphetamine Screen, Ur NEGATIVE NEGATIVE    Barbiturate Screen, Ur NEGATIVE NEGATIVE    Benzodiazepine Screen, Urine NEGATIVE NEGATIVE    Cocaine Metabolite, Urine NEGATIVE NEGATIVE    Methadone Screen, Urine NEGATIVE NEGATIVE    Opiates, Urine NEGATIVE NEGATIVE    Phencyclidine, Urine NEGATIVE NEGATIVE    Propoxyphene, Urine NEGATIVE NEGATIVE    Cannabinoid Scrn, Ur NEGATIVE NEGATIVE    Oxycodone Screen, Ur NEGATIVE NEGATIVE    Methamphetamine, Urine NEGATIVE NEGATIVE    Tricyclic Antidepressants, Urine NEGATIVE NEGATIVE    MDMA, Urine NOT REPORTED NEGATIVE    Buprenorphine Urine NEGATIVE NEGATIVE    Test Information NOT REPORTED    CBC

## 2020-11-17 NOTE — ANESTHESIA POSTPROCEDURE EVALUATION
Department of Anesthesiology  Postprocedure Note    Patient: Pito Leblanc  MRN: [id number]  YOB: 1985  Date of evaluation: 11/17/2020  Time:  2:24 PM     Procedure Summary     Date:  11/16/20 Room / Location:      Anesthesia Start:  1403 Anesthesia Stop:  1006    Procedure:  Labor Analgesia Diagnosis:      Scheduled Providers:   Responsible Provider:  JACQUES Torres CRNA    Anesthesia Type:  epidural ASA Status:  2          Anesthesia Type: epidural    María Elena Phase I:      María Elena Phase II:      Last vitals: Reviewed and per EMR flowsheets.        Anesthesia Post Evaluation    Patient location during evaluation: bedside  Patient participation: complete - patient participated  Level of consciousness: awake and alert  Airway patency: patent  Nausea & Vomiting: no vomiting and no nausea  Complications: no  Cardiovascular status: hemodynamically stable  Respiratory status: room air and spontaneous ventilation  Hydration status: stable

## 2020-11-17 NOTE — PROGRESS NOTES
Department of Obstetrics and Gynecology   Progress Note      SUBJECTIVE:  S/P epidural placement, vaginal pressure    OBJECTIVE:      Vitals:    20 0708 20 0711 20 0716 20 0721   BP:  (!) 116/55 (!) 106/54 112/62   Pulse:  80 88 80   Temp:       TempSrc:       SpO2: 100%          Fetal heart rate:       Baseline Heart Rate:  140        Accelerations:  present       Long Term Variability:  moderate       Decelerations:  absent         Contraction frequency: 2-3 minutes    Membranes:  AROM done with consent and without difficulty - moderate amount of light pink fluid. Fluid is odorless    Cervix:         Dilation:  6 cm         Effacement:  80%         Station:  +1         Position:  anterior             ASSESSMENT & PLAN:    Continue to monitor, consider pitocin for augmentation if necessary. Anticipate .
Discharge instructions discussed with/given to patient; verbalized understanding and agrees. Awaiting discharge orders for .
External lab value for bilirubin entered incorrectly, please disregard 11/17/20 transcutaneous bilirubin result.
Pt arrived to [facility] department with c/o ctx every 10-15 min. States she has been feeling the baby move as usual. Denies vaginal bleeding/leaking. Instructed to change into a gown and provide a urine specimen if able. Pt agreed and denies further need.
Pt sitting up for epidural at this time.
MPV NOT REPORTED 11/17/2020         ASSESSMENT :      Active Problems:    Term pregnancy  Plan: delivered    Vaginal delivery  Plan: stable    Nuchal cord affecting delivery  Plan: delivered    First degree perineal laceration  Plan: stable    Normal delivery          Plan:  D/c home, rto 6 weeks, routine discharge instructions

## 2020-11-17 NOTE — PLAN OF CARE
Problem: Anxiety:  Goal: Level of anxiety will decrease  Description: Level of anxiety will decrease  Outcome: Ongoing     Problem: Fluid Volume - Imbalance:  Goal: Absence of imbalanced fluid volume signs and symptoms  Description: Absence of imbalanced fluid volume signs and symptoms  Outcome: Ongoing  Goal: Absence of intrapartum hemorrhage signs and symptoms  Description: Absence of intrapartum hemorrhage signs and symptoms  Outcome: Ongoing     Problem: Infection - Intrapartum Infection:  Goal: Will show no infection signs and symptoms  Description: Will show no infection signs and symptoms  Outcome: Ongoing     Problem: Pain - Acute:  Goal: Pain level will decrease  Description: Pain level will decrease  Outcome: Ongoing  Goal: Able to cope with pain  Description: Able to cope with pain  Outcome: Ongoing     Problem: Urinary Retention:  Goal: Experiences of bladder distention will decrease  Description: Experiences of bladder distention will decrease  Outcome: Ongoing  Goal: Urinary elimination within specified parameters  Description: Urinary elimination within specified parameters  Outcome: Ongoing     Problem: Breathing Pattern - Ineffective:  Goal: Able to breathe comfortably  Description: Able to breathe comfortably  Outcome: Completed     Problem: Labor Process - Prolonged:  Goal: Labor progression, first stage, within specified pattern  Description: Labor progression, first stage, within specified pattern  Outcome: Completed  Goal: Labor progession, second stage, within specified pattern  Description: Labor progession, second stage, within specified pattern  Outcome: Completed  Goal: Uterine contractions within specified parameters  Description: Uterine contractions within specified parameters  Outcome: Completed     Problem:  Screening:  Goal: Ability to make informed decisions regarding treatment has improved  Description: Ability to make informed decisions regarding treatment has improved  Outcome: Completed     Problem: Tissue Perfusion - Uteroplacental, Altered:  Description: [TRUNCATED] For intrapartum patients with recurrent variable decelerations of the fetal heart rate, consider transcervical amnioinfusion. For patients in labor, avoid prophylactic use of continuous maternal oxygen supplementation to prevent nonreassu . ..   Goal: Absence of abnormal fetal heart rate pattern  Description: Absence of abnormal fetal heart rate pattern  Outcome: Completed

## 2021-01-04 ENCOUNTER — POSTPARTUM VISIT (OUTPATIENT)
Dept: OBGYN CLINIC | Age: 36
End: 2021-01-04
Payer: COMMERCIAL

## 2021-01-04 VITALS
BODY MASS INDEX: 22.23 KG/M2 | WEIGHT: 130.2 LBS | HEIGHT: 64 IN | SYSTOLIC BLOOD PRESSURE: 90 MMHG | DIASTOLIC BLOOD PRESSURE: 64 MMHG

## 2021-01-04 PROCEDURE — 0503F POSTPARTUM CARE VISIT: CPT | Performed by: ADVANCED PRACTICE MIDWIFE

## 2021-01-04 NOTE — PROGRESS NOTES
POSTPARTUM EXAM    Date of service: 2021    Raymundo Griffith  Is a 30 y.o.  female    PT's PCP is: Chely Fitch MD     : 1985     OB History    Para Term  AB Living   4 2 2 0 2 2   SAB TAB Ectopic Molar Multiple Live Births   2 0 0 0 0 2      # Outcome Date GA Lbr Cory/2nd Weight Sex Delivery Anes PTL Lv   4 Term 20 38w3d 04:16 / 00:21 6 lb 2.3 oz (2.786 kg) F Vag-Spont EPI N KORY   3 Term 16 39w0d  6 lb 1 oz (2.75 kg) F Vag-Spont EPI N KORY   2 SAB         ND   1 SAB      SAB           Social History     Tobacco Use   Smoking Status Never Smoker   Smokeless Tobacco Never Used         Social History     Substance and Sexual Activity   Alcohol Use Not Currently         Delivery date 2020    Type of delivery:  Spontaneous vaginal delivery    Laceration:Yes, First degree laceration. Repaired. Infant gender: female    Are you breast or bottle feeding? Bottle    Have you been sexually active since delivery? No    Vital Signs: Blood pressure 90/64, height 5' 4\" (1.626 m), weight 130 lb 3.2 oz (59.1 kg), last menstrual period 2021, unknown if currently breastfeeding. Allergies: Penicillins      Current Outpatient Medications:     ferrous sulfate (IRON 325) 325 (65 Fe) MG tablet, Take 1 tablet by mouth daily (with breakfast), Disp: 30 tablet, Rfl: 5    escitalopram (LEXAPRO) 10 MG tablet, Take 10 mg by mouth daily, Disp: , Rfl:     Prenatal MV-Min-Fe Fum-FA-DHA (PRENATAL 1 PO), Take 1 tablet by mouth daily, Disp: , Rfl:       Chief Complaint   Patient presents with    Postpartum Care     PPV. Pt started cycle today. Pt stopped bleeding about 3 weeks PP. Bottle feeding. Pt has not resumed intercourse.         How many Hours of sleep do you get a night: Adequate - infant wakes every 3-4 hours    Do you have a normal appetite: Yes    Any problems or pain: Denies    Do you feel like you coping well: Yes - taking Lexapro    Is baby sleeping:Yes - well How is baby eating: Very well    How did first pediatrician visit go: Great weight gain with adequate stool/void    HPI:  PT presents for Post partum exam Follow up prn after delivery. Denies bowel/bladder function    Physical Exam  Constitutional:       Appearance: Normal appearance. She is normal weight. Genitourinary:      Vulva normal.      Genitourinary Comments: Perineum well healed   HENT:      Head: Normocephalic. Eyes:      Pupils: Pupils are equal, round, and reactive to light. Neck:      Musculoskeletal: Normal range of motion. Pulmonary:      Effort: Pulmonary effort is normal.      Breath sounds: No wheezing. Musculoskeletal: Normal range of motion. Neurological:      Mental Status: She is alert and oriented to person, place, and time. Skin:     General: Skin is warm and dry. Psychiatric:         Mood and Affect: Mood normal.         Behavior: Behavior normal.   Vitals signs reviewed. Assessment and Plan          Diagnosis Orders   1. Postpartum care and examination         Discussed when menses may resume. Discussed return to normal activities. I am having Jackson Jett \"Radha\" maintain her escitalopram, Prenatal MV-Min-Fe Fum-FA-DHA (PRENATAL 1 PO), and ferrous sulfate. Return in about 6 months (around 7/4/2021) for Yearly. She was also counseled on her preventative health maintenance recommendations and follow-up. There are no Patient Instructions on file for this visit.     VISH ESTRADA,1/4/2021 1:49 PM

## 2021-04-28 RX ORDER — ESCITALOPRAM OXALATE 10 MG/1
TABLET ORAL
Qty: 30 TABLET | Refills: 3 | OUTPATIENT
Start: 2021-04-28

## 2021-04-28 RX ORDER — ESCITALOPRAM OXALATE 10 MG/1
10 TABLET ORAL DAILY
Qty: 30 TABLET | Refills: 3 | Status: SHIPPED | OUTPATIENT
Start: 2021-04-28 | End: 2021-08-02

## 2021-04-28 NOTE — TELEPHONE ENCOUNTER
Patient called - needs refill of Lexapro 10mg PO daily to [facility]    Refill to be sent and patient will call to schedule annual for August/September

## 2021-08-02 ENCOUNTER — OFFICE VISIT (OUTPATIENT)
Dept: OBGYN CLINIC | Age: 36
End: 2021-08-02
Payer: COMMERCIAL

## 2021-08-02 VITALS
SYSTOLIC BLOOD PRESSURE: 120 MMHG | BODY MASS INDEX: 23.25 KG/M2 | DIASTOLIC BLOOD PRESSURE: 80 MMHG | HEIGHT: 64 IN | WEIGHT: 136.2 LBS

## 2021-08-02 DIAGNOSIS — Z12.72 SMEAR, VAGINAL, AS PART OF ROUTINE GYNECOLOGICAL EXAMINATION: Primary | ICD-10-CM

## 2021-08-02 DIAGNOSIS — Z01.419 SMEAR, VAGINAL, AS PART OF ROUTINE GYNECOLOGICAL EXAMINATION: Primary | ICD-10-CM

## 2021-08-02 PROCEDURE — 99395 PREV VISIT EST AGE 18-39: CPT | Performed by: ADVANCED PRACTICE MIDWIFE

## 2021-08-02 RX ORDER — ESCITALOPRAM OXALATE 10 MG/1
10 TABLET ORAL DAILY
Qty: 30 TABLET | Refills: 12 | Status: SHIPPED | OUTPATIENT
Start: 2021-08-02 | End: 2022-08-11

## 2021-08-02 RX ORDER — BEPOTASTINE BESILATE 15 MG/ML
SOLUTION/ DROPS OPHTHALMIC
COMMUNITY
Start: 2021-07-07 | End: 2022-08-11 | Stop reason: ALTCHOICE

## 2021-08-02 RX ORDER — FERROUS SULFATE 325(65) MG
325 TABLET ORAL
Qty: 30 TABLET | Refills: 12 | Status: SHIPPED | OUTPATIENT
Start: 2021-08-02 | End: 2022-08-11 | Stop reason: ALTCHOICE

## 2021-08-02 ASSESSMENT — ENCOUNTER SYMPTOMS
GASTROINTESTINAL NEGATIVE: 1
ABDOMINAL PAIN: 0
RESPIRATORY NEGATIVE: 1
DIARRHEA: 0
CONSTIPATION: 0
SHORTNESS OF BREATH: 0

## 2021-08-02 NOTE — PROGRESS NOTES
YEARLY PHYSICAL    Date of service: 2021    Gila Ortega  Is a 29 y.o.   female    PT's PCP is: Phani Carvajal MD     : 1985                                             Subjective:       Patient's last menstrual period was 2021 (approximate).      Are your menses regular: yes    OB History    Para Term  AB Living   4 2 2 0 2 2   SAB TAB Ectopic Molar Multiple Live Births   2 0 0 0 0 2      # Outcome Date GA Lbr Cory/2nd Weight Sex Delivery Anes PTL Lv   4 Term 20 38w3d 04:16 / 00:21 6 lb 2.3 oz (2.786 kg) F Vag-Spont EPI N KORY   3 Term 16 39w0d  6 lb 1 oz (2.75 kg) F Vag-Spont EPI N KORY   2 SAB         ND   1 SAB      SAB           Social History     Tobacco Use   Smoking Status Never Smoker   Smokeless Tobacco Never Used        Social History     Substance and Sexual Activity   Alcohol Use Yes    Comment: occasional       Family History   Problem Relation Age of Onset    Alzheimer's Disease Paternal Grandmother     Cancer Maternal Grandmother         Esophageal    Cancer Maternal Grandfather         pancreatic    Hypertension Father     Hypertension Brother        Any family history of breast or ovarian cancer: No    Any family history of blood clots: No      Allergies: Penicillins      Current Outpatient Medications:     escitalopram (LEXAPRO) 10 MG tablet, Take 1 tablet by mouth daily, Disp: 30 tablet, Rfl: 12    ferrous sulfate (IRON 325) 325 (65 Fe) MG tablet, Take 1 tablet by mouth daily (with breakfast), Disp: 30 tablet, Rfl: 12    BEPREVE 1.5 % SOLN, , Disp: , Rfl:     Social History     Substance and Sexual Activity   Sexual Activity Yes    Partners: Male       Any bleeding or pain with intercourse: No    Last Yearly:      Last pap: 2019 - normal    Last HPV: 2019 - negative    Do you do self breast exams: Encouraged    Past Medical History:   Diagnosis Date    Abnormal Pap smear of cervix     colposcopy 2010    Anemia     Anxiety     Infertility, female     MTHFR (methylene THF reductase) deficiency and homocystinuria ([facility] [address]        History reviewed. No pertinent surgical history. Family History   Problem Relation Age of Onset    Alzheimer's Disease Paternal Grandmother     Cancer Maternal Grandmother         Esophageal    Cancer Maternal Grandfather         pancreatic    Hypertension Father     Hypertension Brother        Chief Complaint   Patient presents with    Annual Exam     Annual exam. Pap NL 1/21/19. Pt denies concerns. Pt would like refill of Lexapro and iron. Labs:    No results found for this visit on 08/02/21. HPI: Denies breast/pelvic concerns. Menses regular. Monogamous relationship. Normal pap smear 2019. Desires refill of Lexparo and Iron    Review of Systems   Constitutional: Negative. Negative for chills, fatigue and fever. HENT: Negative. Respiratory: Negative. Negative for shortness of breath. Cardiovascular: Negative. Negative for chest pain. Gastrointestinal: Negative. Negative for abdominal pain, constipation and diarrhea. Genitourinary: Negative for dysuria, enuresis, frequency, menstrual problem, pelvic pain, urgency and vaginal bleeding. Musculoskeletal: Negative. Neurological: Negative. Negative for dizziness, light-headedness and headaches. Psychiatric/Behavioral: Negative. Objective  Blood pressure 120/80, height 5' 4\" (1.626 m), weight 136 lb 3.2 oz (61.8 kg), last menstrual period 07/23/2021, unknown if currently breastfeeding. Physical Exam  Constitutional:       Appearance: Normal appearance. She is normal weight. Genitourinary:      Pelvic exam was performed with patient in the lithotomy position. Vulva, urethra, vagina, cervix, uterus, right adnexa and left adnexa normal.      Uterus is anteverted and regular. HENT:      Head: Normocephalic.    Eyes:      Pupils: Pupils are equal, round, and reactive to light. Cardiovascular:      Rate and Rhythm: Normal rate and regular rhythm. Pulses: Normal pulses. Heart sounds: Normal heart sounds. No murmur heard. Pulmonary:      Effort: Pulmonary effort is normal.      Breath sounds: Normal breath sounds. No wheezing. Chest:      Breasts:         Right: Normal.         Left: Normal.   Abdominal:      Palpations: Abdomen is soft. Tenderness: There is no abdominal tenderness. Musculoskeletal:         General: Normal range of motion. Cervical back: Normal range of motion. No muscular tenderness. Neurological:      Mental Status: She is alert and oriented to person, place, and time. Skin:     General: Skin is warm and dry. Psychiatric:         Behavior: Behavior normal.   Vitals and nursing note reviewed. Chaperone present: Declined. Assessment and Plan          Diagnosis Orders   1. Smear, vaginal, as part of routine gynecological examination       Repeat Annual every 1 year  Cervical Cytology Evaluation begins at 24years old. If Negative Cytology, Follow-up screening per current guidelines. Mammograms every 1year. If 35 yo and last mammogram was negative. Calcium and Vitamin D dosing reviewed. Colonoscopy screening reviewed as well as onset for bone density testing. Birth control and barrier recommendationsdiscussed. STD counseling and prevention reviewed. Gardisil counseling completed for all patients. Routine healthmaintenance per patients PCP. I am having Jordyn Ugalde \"Radha\" start on escitalopram and ferrous sulfate. I am also having her maintain her Bepreve. Return in about 1 year (around 8/2/2022) for Yearly. She was also counseled on her preventative health maintenance recommendations and follow-up. There are no Patient Instructions on file for this visit.     JACQUES Guillory CNM,8/2/2021 9:14 AM

## 2022-08-11 ENCOUNTER — OFFICE VISIT (OUTPATIENT)
Dept: OBGYN CLINIC | Age: 37
End: 2022-08-11
Payer: COMMERCIAL

## 2022-08-11 ENCOUNTER — HOSPITAL ENCOUNTER (OUTPATIENT)
Age: 37
Setting detail: SPECIMEN
Discharge: HOME | End: 2022-08-11

## 2022-08-11 VITALS
BODY MASS INDEX: 23.39 KG/M2 | HEIGHT: 64 IN | SYSTOLIC BLOOD PRESSURE: 96 MMHG | DIASTOLIC BLOOD PRESSURE: 70 MMHG | WEIGHT: 137 LBS

## 2022-08-11 DIAGNOSIS — Z01.419 SMEAR, VAGINAL, AS PART OF ROUTINE GYNECOLOGICAL EXAMINATION: Primary | ICD-10-CM

## 2022-08-11 DIAGNOSIS — Z12.4 SCREENING FOR CERVICAL CANCER: ICD-10-CM

## 2022-08-11 DIAGNOSIS — Z12.72 SMEAR, VAGINAL, AS PART OF ROUTINE GYNECOLOGICAL EXAMINATION: Primary | ICD-10-CM

## 2022-08-11 PROCEDURE — 99395 PREV VISIT EST AGE 18-39: CPT | Performed by: ADVANCED PRACTICE MIDWIFE

## 2022-08-11 RX ORDER — ESCITALOPRAM OXALATE 10 MG/1
10 TABLET ORAL DAILY
Qty: 30 TABLET | Refills: 12 | Status: SHIPPED | OUTPATIENT
Start: 2022-08-11

## 2022-08-11 ASSESSMENT — ENCOUNTER SYMPTOMS
SHORTNESS OF BREATH: 0
CONSTIPATION: 0
ABDOMINAL PAIN: 0
DIARRHEA: 0
RESPIRATORY NEGATIVE: 1
GASTROINTESTINAL NEGATIVE: 1

## 2022-08-11 NOTE — PROGRESS NOTES
YEARLY PHYSICAL    Date of service: 2022    Kaitlynn Tim  Is a 40 y.o.   female    PT's PCP is: Ryne Tracy MD     : 1985                                             Subjective:       Patient's last menstrual period was 2022 (exact date). Are your menses regular: yes    OB History    Para Term  AB Living   4 2 2 0 2 2   SAB IAB Ectopic Molar Multiple Live Births   2 0 0 0 0 2      # Outcome Date GA Lbr Cory/2nd Weight Sex Delivery Anes PTL Lv   4 Term 20 38w3d 04:16 / 00:21 6 lb 2.3 oz (2.786 kg) F Vag-Spont EPI N KORY   3 Term 16 39w0d  6 lb 1 oz (2.75 kg) F Vag-Spont EPI N KORY   2 SAB         ND   1 SAB      SAB           Social History     Tobacco Use   Smoking Status Never   Smokeless Tobacco Never        Social History     Substance and Sexual Activity   Alcohol Use Yes    Comment: occasional       Family History   Problem Relation Age of Onset    Alzheimer's Disease Paternal Grandmother     Cancer Maternal Grandmother         Esophageal    Cancer Maternal Grandfather         pancreatic    Hypertension Father     Hypertension Brother        Any family history of breast or ovarian cancer: No    Any family history of blood clots: No      Allergies: Penicillins      Current Outpatient Medications:     escitalopram (LEXAPRO) 10 MG tablet, Take 1 tablet by mouth in the morning., Disp: 30 tablet, Rfl: 12    Social History     Substance and Sexual Activity   Sexual Activity Yes    Partners: Male       Any bleeding or pain with intercourse: No    Last Yearly:      Last pap: 2019 - normal    Last HPV: 2019 - negative    Do you do self breast exams: Encouraged    Past Medical History:   Diagnosis Date    Abnormal Pap smear of cervix     colposcopy     Anemia     Anxiety     Infertility, female     MTHFR (methylene THF reductase) deficiency [facility]        History reviewed. No pertinent surgical history. Family History   Problem Relation Age of Onset    Alzheimer's Disease Paternal Grandmother     Cancer Maternal Grandmother         Esophageal    Cancer Maternal Grandfather         pancreatic    Hypertension Father     Hypertension Brother        Chief Complaint   Patient presents with    Annual Exam     Pap due. Pt denies concerns. Desires refill of Lexapro. Labs:    No results found for this visit on 08/11/22. HPI:  Annual.  Denies breast/pelvic concerns. Menses regular. Monogamous relationship. Pap due - last was in 2019 and normal.  Desires lexapro refill. Review of Systems   Constitutional: Negative. Negative for chills, fatigue and fever. HENT: Negative. Respiratory: Negative. Negative for shortness of breath. Cardiovascular: Negative. Negative for chest pain. Gastrointestinal: Negative. Negative for abdominal pain, constipation and diarrhea. Genitourinary:  Negative for dysuria, enuresis, frequency, menstrual problem, pelvic pain, urgency and vaginal bleeding. Musculoskeletal: Negative. Neurological: Negative. Negative for dizziness, light-headedness and headaches. Psychiatric/Behavioral: Negative. Objective  Blood pressure 96/70, height 5' 4\" (1.626 m), weight 137 lb (62.1 kg), last menstrual period 07/21/2022, not currently breastfeeding. Physical Exam  Constitutional:       Appearance: Normal appearance. She is normal weight. Genitourinary:      Vulva, bladder and urethral meatus normal.      No vaginal discharge or tenderness. No vaginal prolapse present. Right Adnexa: not tender. Left Adnexa: not tender. No cervical motion tenderness or discharge. Uterus is not tender. Pelvic exam was performed with patient in the lithotomy position. Breasts:     Breasts are symmetrical.      Breasts are soft. Right: No mass, nipple discharge, skin change or tenderness.       Left: No mass, nipple discharge, skin change or tenderness. HENT:      Head: Normocephalic. Eyes:      Pupils: Pupils are equal, round, and reactive to light. Cardiovascular:      Rate and Rhythm: Normal rate and regular rhythm. Pulses: Normal pulses. Heart sounds: Normal heart sounds. No murmur heard. Pulmonary:      Effort: Pulmonary effort is normal.      Breath sounds: Normal breath sounds. No wheezing. Abdominal:      Palpations: Abdomen is soft. Tenderness: There is no abdominal tenderness. Musculoskeletal:         General: Normal range of motion. Cervical back: Normal range of motion. No muscular tenderness. Neurological:      Mental Status: She is alert and oriented to person, place, and time. Skin:     General: Skin is warm and dry. Psychiatric:         Behavior: Behavior normal.   Vitals and nursing note reviewed. Chaperone present: Declined. Assessment and Plan          Diagnosis Orders   1. Smear, vaginal, as part of routine gynecological examination        2. Screening for cervical cancer  PAP SMEAR        Repeat Annual every 1 year  Cervical Cytology Evaluation begins at 24years old. If Negative Cytology, Follow-up screening per current guidelines. Mammograms every 1year. If 35 yo and last mammogram was negative. Calcium and Vitamin D dosing reviewed. Birth control and barrier recommendationsdiscussed. STD counseling and prevention reviewed. Routine healthmaintenance per patients PCP. I have discontinued Gwenyth Lot \"Radha\"'s Bepreve and ferrous sulfate. I am also having her start on escitalopram.    Return in about 1 year (around 8/11/2023) for Yearly. She was also counseled on her preventative health maintenance recommendations and follow-up. There are no Patient Instructions on file for this visit.     Mary Anne Nance 81 10:05 AM

## 2022-08-14 LAB
HPV SAMPLE: NORMAL
HPV, GENOTYPE 16: NOT DETECTED
HPV, GENOTYPE 18: NOT DETECTED
HPV, HIGH RISK OTHER: NOT DETECTED
HPV, INTERPRETATION: NORMAL
SPECIMEN DESCRIPTION: NORMAL

## 2022-08-17 LAB — CYTOLOGY REPORT: NORMAL

## 2023-08-10 ENCOUNTER — OFFICE VISIT (OUTPATIENT)
Dept: OBGYN CLINIC | Age: 38
End: 2023-08-10
Payer: COMMERCIAL

## 2023-08-10 VITALS
SYSTOLIC BLOOD PRESSURE: 110 MMHG | HEIGHT: 64 IN | DIASTOLIC BLOOD PRESSURE: 72 MMHG | WEIGHT: 138.2 LBS | BODY MASS INDEX: 23.6 KG/M2

## 2023-08-10 DIAGNOSIS — Z01.419 SMEAR, VAGINAL, AS PART OF ROUTINE GYNECOLOGICAL EXAMINATION: Primary | ICD-10-CM

## 2023-08-10 DIAGNOSIS — Z12.72 SMEAR, VAGINAL, AS PART OF ROUTINE GYNECOLOGICAL EXAMINATION: Primary | ICD-10-CM

## 2023-08-10 PROCEDURE — 99395 PREV VISIT EST AGE 18-39: CPT | Performed by: ADVANCED PRACTICE MIDWIFE

## 2023-08-10 RX ORDER — ESCITALOPRAM OXALATE 10 MG/1
10 TABLET ORAL DAILY
Qty: 30 TABLET | Refills: 12 | Status: SHIPPED | OUTPATIENT
Start: 2023-08-10

## 2023-08-10 ASSESSMENT — ENCOUNTER SYMPTOMS
RESPIRATORY NEGATIVE: 1
DIARRHEA: 0
ABDOMINAL PAIN: 0
SHORTNESS OF BREATH: 0
CONSTIPATION: 0
GASTROINTESTINAL NEGATIVE: 1

## 2023-08-10 NOTE — PROGRESS NOTES
Chaperone for Intimate Exam  Chaperone was offered as part of the rooming process. Patient declined and agrees to continue with exam without a chaperone.
reductase) deficiency and homocystinuria ([address] )        History reviewed. No pertinent surgical history. Family History   Problem Relation Age of Onset    Alzheimer's Disease Paternal Grandmother     Cancer Maternal Grandmother         Esophageal    Cancer Maternal Grandfather         pancreatic    Hypertension Father     Hypertension Brother        Chief Complaint   Patient presents with    Annual Exam     Pap NL 8/11/22. Pt denies concerns. Medication Refill     Desires refill of Lexapro. Labs:    No results found for this visit on 08/10/23. HPI:  Annual.  Denies breast/pelvic concerns. Menses regular. Monogamous relationship. Desires refill of lexparo. Pap normal/negative 2022      Review of Systems   Constitutional: Negative. Negative for chills, fatigue and fever. HENT: Negative. Respiratory: Negative. Negative for shortness of breath. Cardiovascular: Negative. Negative for chest pain. Gastrointestinal: Negative. Negative for abdominal pain, constipation and diarrhea. Genitourinary:  Negative for dysuria, enuresis, frequency, menstrual problem, pelvic pain, urgency and vaginal bleeding. Musculoskeletal: Negative. Neurological: Negative. Negative for dizziness, light-headedness and headaches. Psychiatric/Behavioral: Negative. Objective  Blood pressure 110/72, height 5' 4\" (1.626 m), weight 138 lb 3.2 oz (62.7 kg), last menstrual period 07/23/2023, not currently breastfeeding. Physical Exam  Constitutional:       Appearance: Normal appearance. She is normal weight. Genitourinary:      Vulva, bladder and urethral meatus normal.      No vaginal discharge or tenderness. No vaginal prolapse present. Right Adnexa: not tender. Left Adnexa: not tender. No cervical motion tenderness or discharge. Uterus is not tender. Pelvic exam was performed with patient in the lithotomy position.    Breasts:     Breasts are symmetrical.      Breasts

## 2024-08-20 ENCOUNTER — TELEPHONE (OUTPATIENT)
Dept: OBGYN CLINIC | Age: 39
End: 2024-08-20

## 2024-08-20 RX ORDER — ESCITALOPRAM OXALATE 20 MG/1
20 TABLET ORAL DAILY
Qty: 30 TABLET | Refills: 2 | Status: SHIPPED | OUTPATIENT
Start: 2024-08-20

## 2024-08-20 NOTE — TELEPHONE ENCOUNTER
Pt calling stating she had t o move her annual appt to October but it out of Lexapro and needs refill. 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 Please send to [facility].

## 2024-11-07 ENCOUNTER — OFFICE VISIT (OUTPATIENT)
Dept: OBGYN CLINIC | Age: 39
End: 2024-11-07
Payer: COMMERCIAL

## 2024-11-07 VITALS
WEIGHT: 137 LBS | SYSTOLIC BLOOD PRESSURE: 110 MMHG | DIASTOLIC BLOOD PRESSURE: 78 MMHG | BODY MASS INDEX: 23.39 KG/M2 | HEIGHT: 64 IN

## 2024-11-07 DIAGNOSIS — Z12.72 SMEAR, VAGINAL, AS PART OF ROUTINE GYNECOLOGICAL EXAMINATION: Primary | ICD-10-CM

## 2024-11-07 DIAGNOSIS — Z76.0 MEDICATION REFILL: ICD-10-CM

## 2024-11-07 DIAGNOSIS — Z01.419 SMEAR, VAGINAL, AS PART OF ROUTINE GYNECOLOGICAL EXAMINATION: Primary | ICD-10-CM

## 2024-11-07 PROCEDURE — 99395 PREV VISIT EST AGE 18-39: CPT | Performed by: ADVANCED PRACTICE MIDWIFE

## 2024-11-07 PROCEDURE — G8484 FLU IMMUNIZE NO ADMIN: HCPCS | Performed by: ADVANCED PRACTICE MIDWIFE

## 2024-11-07 RX ORDER — ESCITALOPRAM OXALATE 20 MG/1
20 TABLET ORAL DAILY
Qty: 30 TABLET | Refills: 12 | Status: SHIPPED | OUTPATIENT
Start: 2024-11-07

## 2024-11-07 ASSESSMENT — ENCOUNTER SYMPTOMS
ABDOMINAL PAIN: 0
CONSTIPATION: 0
DIARRHEA: 0
RESPIRATORY NEGATIVE: 1
SHORTNESS OF BREATH: 0
GASTROINTESTINAL NEGATIVE: 1

## 2024-11-07 NOTE — PROGRESS NOTES
YEARLY PHYSICAL    Date of service: 2024    Alyx Rodgers  Is a 39 y.o.   female    PT's PCP is: Amairani Valentino MD     : 1985                                             Subjective:       Patient's last menstrual period was 10/14/2024 (exact date).     Are your menses regular: yes    OB History    Para Term  AB Living   4 2 2 0 2 2   SAB IAB Ectopic Molar Multiple Live Births   2 0 0 0 0 2      # Outcome Date GA Lbr Cory/2nd Weight Sex Type Anes PTL Lv   4 Term 20 38w3d 04:16 / 00:21 2.786 kg (6 lb 2.3 oz) F Vag-Spont EPI N KORY   3 Term 16 39w0d  2.75 kg (6 lb 1 oz) F Vag-Spont EPI N KORY   2 SAB         ND   1 SAB      SAB           Social History     Tobacco Use   Smoking Status Never   Smokeless Tobacco Never        Social History     Substance and Sexual Activity   Alcohol Use Yes    Alcohol/week: 2.0 standard drinks of alcohol    Types: 2 Cans of beer per week    Comment: occasional       Family History   Problem Relation Age of Onset    Alzheimer's Disease Paternal Grandmother     Cancer Maternal Grandmother         Esophageal    Cancer Maternal Grandfather         pancreatic    Hypertension Father     Hypertension Brother        Any family history of breast or ovarian cancer: No    Any family history of blood clots: No      Allergies: Penicillins      Current Outpatient Medications:     escitalopram (LEXAPRO) 20 MG tablet, Take 1 tablet by mouth daily, Disp: 30 tablet, Rfl: 12    Social History     Substance and Sexual Activity   Sexual Activity Yes    Partners: Male       Any bleeding or pain with intercourse: No    Last Yearly:      Last pap:  - normal    Last HPV:  - negative    Do you do self breast exams: Encouraged    Past Medical History:   Diagnosis Date    Abnormal Pap smear of cervix     colposcopy     Anemia     Anxiety     Infertility, female     MTHFR (methylene THF